# Patient Record
Sex: MALE | Race: BLACK OR AFRICAN AMERICAN | NOT HISPANIC OR LATINO | Employment: FULL TIME | URBAN - METROPOLITAN AREA
[De-identification: names, ages, dates, MRNs, and addresses within clinical notes are randomized per-mention and may not be internally consistent; named-entity substitution may affect disease eponyms.]

---

## 2017-09-01 ENCOUNTER — TELEPHONE (OUTPATIENT)
Dept: UROLOGY | Facility: CLINIC | Age: 62
End: 2017-09-01

## 2017-09-01 NOTE — TELEPHONE ENCOUNTER
----- Message from Livia Wood sent at 9/1/2017 10:46 AM CDT -----  Contact: self  Patient is requesting a prostate examination.   Please advise.   [phone].

## 2017-10-24 ENCOUNTER — OFFICE VISIT (OUTPATIENT)
Dept: UROLOGY | Facility: CLINIC | Age: 62
End: 2017-10-24
Payer: COMMERCIAL

## 2017-10-24 VITALS
HEIGHT: 74 IN | WEIGHT: 198.19 LBS | BODY MASS INDEX: 25.44 KG/M2 | DIASTOLIC BLOOD PRESSURE: 70 MMHG | HEART RATE: 62 BPM | SYSTOLIC BLOOD PRESSURE: 122 MMHG

## 2017-10-24 DIAGNOSIS — N20.0 KIDNEY STONES: ICD-10-CM

## 2017-10-24 DIAGNOSIS — N40.0 BPH WITHOUT OBSTRUCTION/LOWER URINARY TRACT SYMPTOMS: Primary | ICD-10-CM

## 2017-10-24 DIAGNOSIS — N52.9 ED (ERECTILE DYSFUNCTION) OF ORGANIC ORIGIN: ICD-10-CM

## 2017-10-24 LAB
BILIRUB SERPL-MCNC: NORMAL MG/DL
BLOOD URINE, POC: NORMAL
COLOR, POC UA: YELLOW
GLUCOSE UR QL STRIP: NORMAL
KETONES UR QL STRIP: NORMAL
LEUKOCYTE ESTERASE URINE, POC: NORMAL
NITRITE, POC UA: NORMAL
PH, POC UA: 6
PROTEIN, POC: NORMAL
SPECIFIC GRAVITY, POC UA: 1000
UROBILINOGEN, POC UA: NORMAL

## 2017-10-24 PROCEDURE — 81001 URINALYSIS AUTO W/SCOPE: CPT | Mod: S$GLB,,, | Performed by: UROLOGY

## 2017-10-24 PROCEDURE — 99999 PR PBB SHADOW E&M-EST. PATIENT-LVL III: CPT | Mod: PBBFAC,,, | Performed by: UROLOGY

## 2017-10-24 PROCEDURE — 99214 OFFICE O/P EST MOD 30 MIN: CPT | Mod: 25,S$GLB,, | Performed by: UROLOGY

## 2017-10-24 NOTE — PROGRESS NOTES
Subjective:       Patient ID: Rubin Alvarez Sr. is a 62 y.o. male who was referred by No ref. provider found    Chief Complaint:   Chief Complaint   Patient presents with    Benign Prostatic Hypertrophy     annual visit with psa          History of Present Illness  Benign Prostatic Hypertrophy  Patient complains of lower urinary tract symptoms. He reports nocturia one time a night. He denies straining, urgency and weak stream. Patient states symptoms are of no severity. Onset of symptoms was several years ago and was gradual in onset.He has no personal history and a family history of prostate cancer with his brother. He reports a history of no complicating symptoms. He denies flank pain, gross hematuria,and recurrent UTI.    He went to  recently and diagnosed with a kidney stone.  He is not sure if he passed it but he has not had pain for a few weeks now.    Erectile Dysfunction  Patient complains of erectile dysfunction. Onset of dysfunction was several years ago and was gradual in onset.  Patient states the nature of difficulty is both attaining and maintaining erection. Full erections occur never. Partial erections occur never. Libido is not affected. Risk factors for ED include urologic disease, bph  . Patient denies history of pelvic radiation. Previous treatment of ED includes Viagra.    ACTIVE MEDICAL ISSUES:  There is no problem list on file for this patient.      PAST MEDICAL HISTORY  Past Medical History:   Diagnosis Date    Hypertrophy of prostate without urinary obstruction and other lower urinary tract symptoms (LUTS)     Impotence of organic origin     Kidney stone        PAST SURGICAL HISTORY:  Past Surgical History:   Procedure Laterality Date    cysto/ureteroscopy stone removal         SOCIAL HISTORY:  Social History   Substance Use Topics    Smoking status: Never Smoker    Smokeless tobacco: Never Used    Alcohol use No       FAMILY HISTORY:  Family History   Problem Relation Age of Onset  "   Skin cancer Father     Diabetes Mother     Kidney disease Mother     Heart disease Mother     Hypertension Mother        ALLERGIES AND MEDICATIONS: updated and reviewed.  Review of patient's allergies indicates:  No Known Allergies  Current Outpatient Prescriptions   Medication Sig    ERGOCALCIFEROL, VITAMIN D2, (VITAMIN D ORAL) Take by mouth.    esomeprazole (NEXIUM) 40 MG capsule Take 40 mg by mouth before breakfast.    losartan (COZAAR) 50 MG tablet Take 50 mg by mouth once daily.    rosuvastatin (CRESTOR) 20 MG tablet Take 20 mg by mouth once daily.    sildenafil (VIAGRA) 100 MG tablet Take 1 tablet (100 mg total) by mouth daily as needed for Erectile Dysfunction.     No current facility-administered medications for this visit.        Review of Systems   Constitutional: Negative for activity change, fatigue, fever and unexpected weight change.   HENT: Negative for congestion.    Eyes: Negative for redness.   Respiratory: Negative for chest tightness and shortness of breath.    Cardiovascular: Negative for chest pain and leg swelling.   Gastrointestinal: Negative for abdominal pain, constipation, diarrhea, nausea and vomiting.   Genitourinary: Negative for dysuria, flank pain, frequency, hematuria, penile pain, penile swelling, scrotal swelling, testicular pain and urgency.   Musculoskeletal: Negative for arthralgias and back pain.   Neurological: Negative for dizziness and light-headedness.   Psychiatric/Behavioral: Negative for behavioral problems and confusion. The patient is not nervous/anxious.    All other systems reviewed and are negative.      Objective:      Vitals:    10/24/17 1003   BP: 122/70   Pulse: 62   Weight: 89.9 kg (198 lb 3.1 oz)   Height: 6' 2" (1.88 m)     Physical Exam   Nursing note and vitals reviewed.  Constitutional: He is oriented to person, place, and time. He appears well-developed and well-nourished.   HENT:   Head: Normocephalic.   Eyes: Conjunctivae are normal. "   Neck: Normal range of motion. No tracheal deviation present. No thyromegaly present.   Cardiovascular: Normal rate and normal heart sounds.    Pulmonary/Chest: Effort normal and breath sounds normal. No respiratory distress. He has no wheezes.   Abdominal: Soft. He exhibits no distension and no mass. There is no hepatosplenomegaly. There is no tenderness. There is no rebound, no guarding and no CVA tenderness. No hernia. Hernia confirmed negative in the right inguinal area and confirmed negative in the left inguinal area.   Genitourinary: Rectum normal, testes normal and penis normal. Rectal exam shows no external hemorrhoid, no mass and no tenderness. Prostate is enlarged. Prostate is not tender. Right testis shows no mass and no tenderness. Left testis shows no mass and no tenderness.       Musculoskeletal: He exhibits no edema or tenderness.   Lymphadenopathy: No inguinal adenopathy noted on the right or left side.   Neurological: He is alert and oriented to person, place, and time.   Skin: Skin is warm and dry. No rash noted. No erythema.     Psychiatric: He has a normal mood and affect. His behavior is normal. Judgment and thought content normal.       Urine dipstick shows negative for all components.  Micro exam: negative for WBC's or RBC's.      10/19/2017  PSA 1.1    Assessment:       1. BPH without obstruction/lower urinary tract symptoms    2. ED (erectile dysfunction) of organic origin    3. Kidney stones          Plan:       1. BPH without obstruction/lower urinary tract symptoms    - US Retroperitoneal Complete (Kidney and; Future  - POCT urinalysis, dipstick or tablet reag  - Prostate Specific Antigen, Diagnostic; Future    2. ED (erectile dysfunction) of organic origin  Viagra when needed    3. Kidney stones  He will call if he has return of pain.    - US Retroperitoneal Complete (Kidney and; Future            No Follow-up on file.

## 2021-11-24 ENCOUNTER — OFFICE VISIT (OUTPATIENT)
Dept: URGENT CARE | Facility: CLINIC | Age: 66
End: 2021-11-24
Payer: COMMERCIAL

## 2021-11-24 VITALS
OXYGEN SATURATION: 98 % | TEMPERATURE: 98 F | HEIGHT: 74 IN | DIASTOLIC BLOOD PRESSURE: 72 MMHG | SYSTOLIC BLOOD PRESSURE: 115 MMHG | RESPIRATION RATE: 18 BRPM | WEIGHT: 198 LBS | HEART RATE: 89 BPM | BODY MASS INDEX: 25.41 KG/M2
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
## 2021-11-24 DIAGNOSIS — M25.571 ACUTE RIGHT ANKLE PAIN: ICD-10-CM

## 2021-11-24 DIAGNOSIS — M54.50 LUMBAR PAIN: ICD-10-CM

## 2021-11-24 DIAGNOSIS — M54.6 LEFT-SIDED THORACIC BACK PAIN, UNSPECIFIED CHRONICITY: ICD-10-CM

## 2021-11-24 PROCEDURE — 72100 X-RAY EXAM L-S SPINE 2/3 VWS: CPT | Mod: S$GLB,,, | Performed by: RADIOLOGY

## 2021-11-24 PROCEDURE — 99204 OFFICE O/P NEW MOD 45 MIN: CPT | Mod: S$GLB,,, | Performed by: PHYSICIAN ASSISTANT

## 2021-11-24 PROCEDURE — 73610 X-RAY EXAM OF ANKLE: CPT | Mod: RT,S$GLB,, | Performed by: RADIOLOGY

## 2021-11-24 PROCEDURE — 71046 X-RAY EXAM CHEST 2 VIEWS: CPT | Mod: S$GLB,,, | Performed by: RADIOLOGY

## 2021-11-24 PROCEDURE — 73610 XR ANKLE COMPLETE 3 VIEW RIGHT: ICD-10-PCS | Mod: RT,S$GLB,, | Performed by: RADIOLOGY

## 2021-11-24 PROCEDURE — 71046 XR CHEST PA AND LATERAL: ICD-10-PCS | Mod: S$GLB,,, | Performed by: RADIOLOGY

## 2021-11-24 PROCEDURE — 72100 XR LUMBAR SPINE 2 OR 3 VIEWS: ICD-10-PCS | Mod: S$GLB,,, | Performed by: RADIOLOGY

## 2021-11-24 PROCEDURE — 99204 PR OFFICE/OUTPT VISIT, NEW, LEVL IV, 45-59 MIN: ICD-10-PCS | Mod: S$GLB,,, | Performed by: PHYSICIAN ASSISTANT

## 2021-11-24 RX ORDER — CYCLOBENZAPRINE HCL 10 MG
10 TABLET ORAL 3 TIMES DAILY PRN
Qty: 30 TABLET | Refills: 0 | Status: SHIPPED | OUTPATIENT
Start: 2021-11-24 | End: 2021-12-04

## 2021-11-24 RX ORDER — IBUPROFEN 800 MG/1
800 TABLET ORAL 3 TIMES DAILY
Qty: 30 TABLET | Refills: 0 | Status: SHIPPED | OUTPATIENT
Start: 2021-11-24 | End: 2021-12-04

## 2022-12-25 ENCOUNTER — HOSPITAL ENCOUNTER (EMERGENCY)
Facility: HOSPITAL | Age: 67
Discharge: HOME | End: 2022-12-25
Attending: EMERGENCY MEDICINE
Payer: MEDICARE

## 2022-12-25 VITALS
SYSTOLIC BLOOD PRESSURE: 121 MMHG | RESPIRATION RATE: 16 BRPM | DIASTOLIC BLOOD PRESSURE: 64 MMHG | BODY MASS INDEX: 24.52 KG/M2 | TEMPERATURE: 98 F | HEIGHT: 73 IN | HEART RATE: 60 BPM | WEIGHT: 185 LBS | OXYGEN SATURATION: 99 %

## 2022-12-25 DIAGNOSIS — N20.0 KIDNEY STONE: Primary | ICD-10-CM

## 2022-12-25 LAB
ALBUMIN SERPL BCP-MCNC: 4 G/DL (ref 3.5–5.2)
ALP SERPL-CCNC: 57 U/L (ref 55–135)
ALT SERPL W/O P-5'-P-CCNC: 23 U/L (ref 10–44)
AMORPH CRY UR QL COMP ASSIST: ABNORMAL
ANION GAP SERPL CALC-SCNC: 9 MMOL/L (ref 8–16)
AST SERPL-CCNC: 30 U/L (ref 10–40)
BASOPHILS # BLD AUTO: 0.02 K/UL (ref 0–0.2)
BASOPHILS NFR BLD: 0.3 % (ref 0–1.9)
BILIRUB SERPL-MCNC: 0.4 MG/DL (ref 0.1–1)
BILIRUB UR QL STRIP: NEGATIVE
BUN SERPL-MCNC: 9 MG/DL (ref 8–23)
CALCIUM SERPL-MCNC: 8.9 MG/DL (ref 8.7–10.5)
CHLORIDE SERPL-SCNC: 104 MMOL/L (ref 95–110)
CLARITY UR REFRACT.AUTO: ABNORMAL
CO2 SERPL-SCNC: 23 MMOL/L (ref 23–29)
COLOR UR AUTO: YELLOW
CREAT SERPL-MCNC: 1.1 MG/DL (ref 0.5–1.4)
DIFFERENTIAL METHOD: ABNORMAL
EOSINOPHIL # BLD AUTO: 0 K/UL (ref 0–0.5)
EOSINOPHIL NFR BLD: 0.6 % (ref 0–8)
ERYTHROCYTE [DISTWIDTH] IN BLOOD BY AUTOMATED COUNT: 11.9 % (ref 11.5–14.5)
EST. GFR  (NO RACE VARIABLE): >60 ML/MIN/1.73 M^2
GLUCOSE SERPL-MCNC: 146 MG/DL (ref 70–110)
GLUCOSE UR QL STRIP: NEGATIVE
HCT VFR BLD AUTO: 38.9 % (ref 40–54)
HGB BLD-MCNC: 13.1 G/DL (ref 14–18)
HGB UR QL STRIP: ABNORMAL
IMM GRANULOCYTES # BLD AUTO: 0.01 K/UL (ref 0–0.04)
IMM GRANULOCYTES NFR BLD AUTO: 0.2 % (ref 0–0.5)
KETONES UR QL STRIP: NEGATIVE
LEUKOCYTE ESTERASE UR QL STRIP: NEGATIVE
LIPASE SERPL-CCNC: 10 U/L (ref 4–60)
LYMPHOCYTES # BLD AUTO: 1.3 K/UL (ref 1–4.8)
LYMPHOCYTES NFR BLD: 19.7 % (ref 18–48)
MCH RBC QN AUTO: 32.2 PG (ref 27–31)
MCHC RBC AUTO-ENTMCNC: 33.7 G/DL (ref 32–36)
MCV RBC AUTO: 96 FL (ref 82–98)
MICROSCOPIC COMMENT: ABNORMAL
MONOCYTES # BLD AUTO: 0.4 K/UL (ref 0.3–1)
MONOCYTES NFR BLD: 5.5 % (ref 4–15)
NEUTROPHILS # BLD AUTO: 4.7 K/UL (ref 1.8–7.7)
NEUTROPHILS NFR BLD: 73.7 % (ref 38–73)
NITRITE UR QL STRIP: NEGATIVE
NRBC BLD-RTO: 0 /100 WBC
PH UR STRIP: 7 [PH] (ref 5–8)
PLATELET # BLD AUTO: 158 K/UL (ref 150–450)
PMV BLD AUTO: 10.7 FL (ref 9.2–12.9)
POTASSIUM SERPL-SCNC: 4.5 MMOL/L (ref 3.5–5.1)
PROT SERPL-MCNC: 6.8 G/DL (ref 6–8.4)
PROT UR QL STRIP: NEGATIVE
RBC # BLD AUTO: 4.07 M/UL (ref 4.6–6.2)
RBC #/AREA URNS AUTO: 90 /HPF (ref 0–4)
SODIUM SERPL-SCNC: 136 MMOL/L (ref 136–145)
SP GR UR STRIP: 1.01 (ref 1–1.03)
SQUAMOUS #/AREA URNS AUTO: 0 /HPF
URN SPEC COLLECT METH UR: ABNORMAL
WBC # BLD AUTO: 6.4 K/UL (ref 3.9–12.7)
WBC #/AREA URNS AUTO: 4 /HPF (ref 0–5)

## 2022-12-25 PROCEDURE — 96374 THER/PROPH/DIAG INJ IV PUSH: CPT

## 2022-12-25 PROCEDURE — 99284 EMERGENCY DEPT VISIT MOD MDM: CPT | Mod: ,,, | Performed by: EMERGENCY MEDICINE

## 2022-12-25 PROCEDURE — 99284 PR EMERGENCY DEPT VISIT,LEVEL IV: ICD-10-PCS | Mod: ,,, | Performed by: EMERGENCY MEDICINE

## 2022-12-25 PROCEDURE — 85025 COMPLETE CBC W/AUTO DIFF WBC: CPT | Performed by: EMERGENCY MEDICINE

## 2022-12-25 PROCEDURE — 83690 ASSAY OF LIPASE: CPT | Performed by: EMERGENCY MEDICINE

## 2022-12-25 PROCEDURE — 80053 COMPREHEN METABOLIC PANEL: CPT | Performed by: EMERGENCY MEDICINE

## 2022-12-25 PROCEDURE — 96375 TX/PRO/DX INJ NEW DRUG ADDON: CPT

## 2022-12-25 PROCEDURE — 81001 URINALYSIS AUTO W/SCOPE: CPT | Performed by: EMERGENCY MEDICINE

## 2022-12-25 PROCEDURE — 63600175 PHARM REV CODE 636 W HCPCS: Performed by: EMERGENCY MEDICINE

## 2022-12-25 PROCEDURE — 25500020 PHARM REV CODE 255: Performed by: EMERGENCY MEDICINE

## 2022-12-25 PROCEDURE — 99285 EMERGENCY DEPT VISIT HI MDM: CPT | Mod: 25

## 2022-12-25 RX ORDER — ONDANSETRON 4 MG/1
4 TABLET, FILM COATED ORAL EVERY 6 HOURS PRN
Qty: 10 TABLET | Refills: 0 | Status: SHIPPED | OUTPATIENT
Start: 2022-12-25

## 2022-12-25 RX ORDER — IBUPROFEN 400 MG/1
400 TABLET ORAL EVERY 6 HOURS PRN
Qty: 20 TABLET | Refills: 0 | Status: SHIPPED | OUTPATIENT
Start: 2022-12-25

## 2022-12-25 RX ORDER — TAMSULOSIN HYDROCHLORIDE 0.4 MG/1
CAPSULE ORAL DAILY
COMMUNITY

## 2022-12-25 RX ORDER — OXYCODONE HYDROCHLORIDE 5 MG/1
5 TABLET ORAL EVERY 4 HOURS PRN
Qty: 8 TABLET | Refills: 0 | Status: SHIPPED | OUTPATIENT
Start: 2022-12-25

## 2022-12-25 RX ORDER — EZETIMIBE 10 MG/1
10 TABLET ORAL DAILY
COMMUNITY

## 2022-12-25 RX ORDER — MORPHINE SULFATE 2 MG/ML
6 INJECTION, SOLUTION INTRAMUSCULAR; INTRAVENOUS
Status: COMPLETED | OUTPATIENT
Start: 2022-12-25 | End: 2022-12-25

## 2022-12-25 RX ORDER — ACETAMINOPHEN 325 MG/1
650 TABLET ORAL EVERY 6 HOURS PRN
Qty: 30 TABLET | Refills: 0 | Status: SHIPPED | OUTPATIENT
Start: 2022-12-25

## 2022-12-25 RX ORDER — KETOROLAC TROMETHAMINE 30 MG/ML
10 INJECTION, SOLUTION INTRAMUSCULAR; INTRAVENOUS
Status: COMPLETED | OUTPATIENT
Start: 2022-12-25 | End: 2022-12-25

## 2022-12-25 RX ORDER — ONDANSETRON 2 MG/ML
4 INJECTION INTRAMUSCULAR; INTRAVENOUS
Status: COMPLETED | OUTPATIENT
Start: 2022-12-25 | End: 2022-12-25

## 2022-12-25 RX ADMIN — ONDANSETRON 4 MG: 2 INJECTION INTRAMUSCULAR; INTRAVENOUS at 08:12

## 2022-12-25 RX ADMIN — IOHEXOL 100 ML: 350 INJECTION, SOLUTION INTRAVENOUS at 10:12

## 2022-12-25 RX ADMIN — KETOROLAC TROMETHAMINE 10 MG: 30 INJECTION, SOLUTION INTRAMUSCULAR; INTRAVENOUS at 08:12

## 2022-12-25 RX ADMIN — MORPHINE SULFATE 6 MG: 2 INJECTION, SOLUTION INTRAMUSCULAR; INTRAVENOUS at 08:12

## 2022-12-25 NOTE — DISCHARGE INSTRUCTIONS
Your history, exam, labs, CT scanner consistent with kidney stone, renal colic.    At home you can take Tylenol and ibuprofen for pain control in over-the-counter doses.  You were prescribed a short course of oxycodone for breakthrough pain.    Please follow-up with the urologist for continued evaluation of your kidney stones.  You were given a strainer please urinate through a strainer to collect the kidney stone for analysis.    If you develop uncontrolled pain, fevers, pain with urination, weakness, or any other new, worsening worrisome symptoms please return to the emergency department for re-evaluation.

## 2022-12-25 NOTE — ED NOTES
Moaning in pain. Started today    Patient identifiers for Rubin Alvarez Sr. 67 y.o. male checked and correct.  Chief Complaint   Patient presents with    Abdominal Pain     Lower back and right sided flank pain began 6:30am. 10/10 pain, hx of kidney stones. Still has appendix and gallbladder      Past Medical History:   Diagnosis Date    Hypertrophy of prostate without urinary obstruction and other lower urinary tract symptoms (LUTS)     Impotence of organic origin     Kidney stone      Allergies reported: Review of patient's allergies indicates:  No Known Allergies    Appearance: Pt awake, alert & oriented to person, place & time. Pt in no acute distress at present time. Pt is clean and well groomed with clothes appropriately fastened.   Skin: Skin warm, dry & intact. Color consistent with ethnicity. Mucous membranes moist. No breakdown or brusing noted.   Musculoskeletal: Patient moving all extremities well, no obvious swelling or deformities noted.   Respiratory: Respirations spontaneous, even, and non-labored. Visible chest rise noted. Airway is open and patent. No accessory muscle use noted.   Neurologic: Sensation is intact. Speech is clear and appropriate. Eyes open spontaneously, behavior appropriate to situation, follows commands, facial expression symmetrical, bilateral hand grasp equal and even, purposeful motor response noted.  Cardiac: All peripheral pulses present. No Bilateral lower extremity edema. Cap refill is <3 seconds.  Abdomen: Abdomen soft, non distended, non tender to palpation.   : Pt voids independently, denies dysuria, hematuria, frequency.

## 2022-12-25 NOTE — ED PROVIDER NOTES
Encounter Date: 12/25/2022       History     Chief Complaint   Patient presents with    Abdominal Pain     Lower back and right sided flank pain began 6:30am. 10/10 pain, hx of kidney stones. Still has appendix and gallbladder      HPI  67-year-old male with past history of kidney stones coming in with severe right-sided lower back pain and right groin pain starting at 6:30 a.m. this morning.  Feels somewhat like his previous kidney stone.  No clear dysuria frequency or hematuria.  No fevers.  Patient is not participating and significantly more history taking as he is in significant pain.  Positive nausea with no vomiting.    Will ask further questions once pain is controlled.    Once pain is controlled is able to answer questions more clearly.  Right lower back pain radiating around his abdomen and down to his groin.  No specific penile or testicular pain.  No dysuria or frequency.  He is not noticed any hematuria.  His previous kidney stone was on the left.  No abdominal surgeries.    Review of patient's allergies indicates:  No Known Allergies  Past Medical History:   Diagnosis Date    Hypertrophy of prostate without urinary obstruction and other lower urinary tract symptoms (LUTS)     Impotence of organic origin     Kidney stone      Past Surgical History:   Procedure Laterality Date    cysto/ureteroscopy stone removal       Family History   Problem Relation Age of Onset    Skin cancer Father     Diabetes Mother     Kidney disease Mother     Heart disease Mother     Hypertension Mother      Social History     Tobacco Use    Smoking status: Never    Smokeless tobacco: Never   Substance Use Topics    Alcohol use: No    Drug use: No     Review of Systems    Patient not participating in history secondary to significant pain.    Physical Exam     Initial Vitals [12/25/22 0813]   BP Pulse Resp Temp SpO2   (!) 194/97 73 18 97.7 °F (36.5 °C) 97 %      MAP       --         Physical Exam    Physical Exam:  CONSTITUTIONAL:  Well developed, well nourished, very uncomfortable  HENT: Normocephalic, atraumatic    EYES: Sclerae anicteric   NECK: Supple, no thyroid enlargement  CARDIOVASCULAR: Regular rate and rhythm without any murmurs, gallops, rubs.  RESPIRATORY: Speaking in full sentences. Breathing comfortably. Auscultation of the lungs revealed normal breath sounds b/l, no wheezing, no rales, no rhonchi.  ABDOMEN: Soft nonspecific right-sided abdominal discomfort to palpation, lower greater than upper, no masses, no rebound or guarding.  No clear right CVA  NEUROLOGIC: Alert, interacting normally. No facial droop.   MSK: Moving all four extremities.  Skin: Warm and dry. No visible rash on exposed areas of skin.    Psych:  Anxious, uncomfortable      ED Course   Procedures  Labs Reviewed   CBC W/ AUTO DIFFERENTIAL - Abnormal; Notable for the following components:       Result Value    RBC 4.07 (*)     Hemoglobin 13.1 (*)     Hematocrit 38.9 (*)     MCH 32.2 (*)     Gran % 73.7 (*)     All other components within normal limits   COMPREHENSIVE METABOLIC PANEL - Abnormal; Notable for the following components:    Glucose 146 (*)     All other components within normal limits   URINALYSIS, REFLEX TO URINE CULTURE - Abnormal; Notable for the following components:    Appearance, UA Cloudy (*)     Occult Blood UA 2+ (*)     All other components within normal limits    Narrative:     Specimen Source->Urine   URINALYSIS MICROSCOPIC - Abnormal; Notable for the following components:    RBC, UA 90 (*)     All other components within normal limits    Narrative:     Specimen Source->Urine   LIPASE          Imaging Results               CT Abdomen Pelvis With Contrast (Final result)  Result time 12/25/22 11:04:43      Final result by Los Del Castillo MD (12/25/22 11:04:43)                   Impression:      Right 0.3 cm UVJ obstructing stone with mild hydronephrosis.    Prostatomegaly.    Probable hepatic cyst.    Additional findings as above.    Findings  discussed with Tobias Amador MD at 10:25    This report was flagged in Epic as abnormal.    Electronically signed by resident: Ritika Lacey  Date:    12/25/2022  Time:    10:11    Electronically signed by: Los Del Castillo MD  Date:    12/25/2022  Time:    11:04               Narrative:    EXAMINATION:  CT ABDOMEN PELVIS WITH CONTRAST    CLINICAL HISTORY:  RLQ abdominal pain (Age >= 14y);R side abdo pain, Kidney stone vs. appy?;    TECHNIQUE:  Low dose axial images, sagittal and coronal reformations were obtained from the lung bases to the pubic symphysis following the IV administration of 100 mL of Omnipaque 350.Oral contrast was not given.    COMPARISON:  None    FINDINGS:  Heart: Normal in size. No pericardial effusion.    Lung Bases: Mild left lung base dependent ground-glass opacities, likely dependent atelectasis.  No pleural effusion.    Liver: Normal in size and attenuation.  Left hepatic lobe fluid attenuation hypodense lesion likely renal cyst measure 2 cm (axial series image 25) with nearby small subcentimeter hypodensity, too small to characterize.    Gallbladder: No calcified gallstones.    Bile Ducts: No intra or extrahepatic biliary duct dilatation.    Pancreas: No mass or peripancreatic fat stranding.    Spleen: Unremarkable.    Adrenals: Unremarkable.    Kidneys/ Ureters: Normal in size and location. Normal enhancement.  There is 0.3 cm right UVJ stone with hydroureter and hydronephrosis as well as perinephric fat stranding.  Punctate right middle pole nephrolithiasis.  No left nephrolithiasis, hydronephrosis or hydroureter.    Bladder: Diffuse bladder wall thickening, likely due to nondistention.    Reproductive organs: Enlarged prostate measuring 5.8 cm with dystrophic calcifications.    Bowel/Mesentery: Small hiatal hernia.  Small bowel is normal in caliber with no evidence of obstruction. No evidence of inflammation or wall thickening.  Appendix is identified and appear unremarkable.  Colon  demonstrates no focal wall thickening.    Peritoneal Space: No ascites. No free air.    Retroperitoneum: No significant adenopathy.    Abdominal wall: Bilateral inguinal nodes without lymphadenopathy.    Vasculature: Mild calcific atherosclerosis.  No aneurysm.    Bones: Mild degenerative changes.  No acute fracture.                                       Medications   ketorolac injection 9.999 mg (9.999 mg Intravenous Given 12/25/22 0844)   morphine injection 6 mg (6 mg Intravenous Given 12/25/22 0843)   ondansetron injection 4 mg (4 mg Intravenous Given 12/25/22 0844)   iohexoL (OMNIPAQUE 350) injection 100 mL (100 mLs Intravenous Given 12/25/22 1006)     Medical Decision Making:   History:   Old Medical Records: I decided to obtain old medical records.  Clinical Tests:   Lab Tests: Ordered and Reviewed  Radiological Study: Ordered and Reviewed     67-year-old male with past history as noted coming in with sudden onset right-sided back/lower abdominal pain.  Very uncomfortable in the emergency department.    Nonspecific tenderness in the right abdomen.    Can not obtain significant more history at this time because patient is still uncomfortable.  Will control pain with Toradol and morphine.  Zofran for nausea as he has also complained of nausea.  No vomiting.    Will obtain CT scan with contrast to look for kidney stone versus appy versus other bowel pathology.  Reassess after pain medications for further history.  Lack of primary penile or testicular pain, radiation from the lower abdomen/flank, not consistent with dangerous testicular/penile pathology at this time.    Disposition based on ED workup and patient's symptomatology.    Update:  On reassessment after Toradol and morphine patient is significantly more comfortable.    Vitals remained benign, blood pressure improved with pain control.    Labs are benign, creatinine is normal.    Urine with blood but no signs of infection.  CT scan shows a 3 mm stone at  the UPJ with mild hydronephrosis, there is some perinephric stranding.  Clinically given the lack of fevers, flank pain, CVA tenderness, lack of infectious findings on the UA not consistent with infected stone.  Pain is controlled emergency department.  Will discharge home with urine strainer, Tylenol and Motrin for pain control, with oxycodone for breakthrough pain, Zofran for nausea control.  Outpatient follow-up with Urology, ED return precautions.    Findings of ED work up explained to patient. Patient agrees with discharge plan and verbalizes understanding of return precautions.                          Clinical Impression:   Final diagnoses:  [N20.0] Kidney stone (Primary)        ED Disposition Condition    Discharge Stable          ED Prescriptions       Medication Sig Dispense Start Date End Date Auth. Provider    ibuprofen (ADVIL,MOTRIN) 400 MG tablet Take 1 tablet (400 mg total) by mouth every 6 (six) hours as needed. 20 tablet 12/25/2022 -- Tobias Amador MD    acetaminophen (TYLENOL) 325 MG tablet Take 2 tablets (650 mg total) by mouth every 6 (six) hours as needed for Pain. 30 tablet 12/25/2022 -- Tobias Amador MD    oxyCODONE (ROXICODONE) 5 MG immediate release tablet Take 1 tablet (5 mg total) by mouth every 4 (four) hours as needed for Pain. 8 tablet 12/25/2022 -- Tobias Amador MD    ondansetron (ZOFRAN) 4 MG tablet Take 1 tablet (4 mg total) by mouth every 6 (six) hours as needed for Nausea. 10 tablet 12/25/2022 -- Tobias Amador MD          Follow-up Information       Follow up With Specialties Details Why Contact Info Additional Information    [facility] [address] [facility] In 1 week  [id number]  [facility] [address] [phone] Main Building, 4th Floor Please park in [facility] and take [facility] elevator             Tobias Amador MD  12/26/22 2729

## 2024-09-12 ENCOUNTER — OFFICE VISIT (OUTPATIENT)
Dept: URGENT CARE | Facility: CLINIC | Age: 69
End: 2024-09-12
Payer: MEDICARE

## 2024-09-12 VITALS
RESPIRATION RATE: 18 BRPM | HEART RATE: 80 BPM | OXYGEN SATURATION: 96 % | DIASTOLIC BLOOD PRESSURE: 88 MMHG | BODY MASS INDEX: 26.44 KG/M2 | HEIGHT: 74 IN | TEMPERATURE: 97 F | SYSTOLIC BLOOD PRESSURE: 161 MMHG | WEIGHT: 206 LBS

## 2024-09-12 DIAGNOSIS — S01.511A LIP LACERATION, INITIAL ENCOUNTER: Primary | ICD-10-CM

## 2024-09-12 RX ORDER — B1/B2/B3/B5/B6/IRON/METH/CHOLN 2.5-18/15
1 LIQUID (ML) ORAL
COMMUNITY

## 2024-09-12 RX ORDER — METFORMIN HYDROCHLORIDE 500 MG/1
500 TABLET, EXTENDED RELEASE ORAL
COMMUNITY

## 2024-09-12 RX ORDER — LIDOCAINE HYDROCHLORIDE 20 MG/ML
2.5 SOLUTION OROPHARYNGEAL ONCE
Status: COMPLETED | OUTPATIENT
Start: 2024-09-12 | End: 2024-09-12

## 2024-09-12 RX ORDER — PERPHENAZINE 2 MG
TABLET ORAL DAILY
COMMUNITY

## 2024-09-12 RX ORDER — ASPIRIN 81 MG/1
TABLET ORAL
COMMUNITY

## 2024-09-12 RX ORDER — AMOXICILLIN 875 MG/1
875 TABLET, FILM COATED ORAL EVERY 12 HOURS
Qty: 6 TABLET | Refills: 0 | Status: SHIPPED | OUTPATIENT
Start: 2024-09-12 | End: 2024-09-15

## 2024-09-12 RX ORDER — CELECOXIB 200 MG/1
200 CAPSULE ORAL
COMMUNITY

## 2024-09-12 RX ORDER — ZINC GLUCONATE 50 MG
TABLET ORAL
COMMUNITY

## 2024-09-12 RX ADMIN — LIDOCAINE HYDROCHLORIDE 2.5 ML: 20 SOLUTION OROPHARYNGEAL at 03:09

## 2024-09-12 NOTE — PROGRESS NOTES
"Subjective:      Patient ID: Rubin Alvarez Sr. is a 69 y.o. male.    Vitals:  height is 6' 2" (1.88 m) and weight is 93.4 kg (206 lb). His temperature is 97.3 °F (36.3 °C). His blood pressure is 161/88 (abnormal) and his pulse is 80. His respiration is 18 and oxygen saturation is 96%.     Chief Complaint: Lip Split    Pt was hit in the face with a ladder trying to stepped up on it. Ladder hit pt in his upper lip and he has had bleeding to the laceration since then. Last tetanus was back in 2014. No other reported complaints or concerns at this time.     Other  This is a new problem. The current episode started today. The problem occurs constantly. The problem has been unchanged. Pertinent negatives include no abdominal pain, anorexia, arthralgias, change in bowel habit, chest pain, chills, congestion, coughing, diaphoresis, fatigue, fever, headaches, joint swelling, myalgias, nausea, neck pain, numbness, rash, sore throat, swollen glands, urinary symptoms, vertigo, visual change, vomiting or weakness. Nothing aggravates the symptoms. He has tried ice for the symptoms. The treatment provided mild relief.       Constitution: Negative for chills, sweating, fatigue and fever.   HENT:  Negative for congestion and sore throat.    Neck: Negative for neck pain.   Cardiovascular:  Negative for chest pain.   Respiratory:  Negative for cough.    Gastrointestinal:  Negative for abdominal pain, nausea and vomiting.   Musculoskeletal:  Negative for joint pain, joint swelling and muscle ache.   Skin:  Negative for rash.   Neurological:  Negative for history of vertigo, headaches and numbness.      Objective:     Physical Exam   Constitutional: He is oriented to person, place, and time. He does not appear ill. No distress.   HENT:   Head: Normocephalic.   Mouth/Throat:      Comments: Approx 2 cm laceration extending from dry to wet vermillion of the right upper lip. Does not cross midline or vermillion border.   Eyes: Conjunctivae " are normal.   Pulmonary/Chest: No respiratory distress.   Neurological: He is alert and oriented to person, place, and time. Coordination normal.   Skin: Skin is warm and dry.   Psychiatric: His behavior is normal.   Nursing note and vitals reviewed.      Assessment:     1. Lip laceration, initial encounter        Plan:     Laceration repaired as per procedure note. Tdap updated. Wound care discussed and prophylactic antibiotic prescribed. Reviewed post repair care and provided instructions on his AVS. Patient and his wife voiced understanding and agreement with the POC. No questions prior to discharge. Will return for removal of the 2 nonabsorbable sutures in 3-5 days.     Lip laceration, initial encounter  -     LIDOcaine viscous HCl 2% oral solution 2.5 mL  -     Tdap (BOOSTRIX) vaccine injection 0.5 mL  -     amoxicillin (AMOXIL) 875 MG tablet; Take 1 tablet (875 mg total) by mouth every 12 (twelve) hours. for 3 days  Dispense: 6 tablet; Refill: 0  -     Laceration Repair

## 2024-09-12 NOTE — PATIENT INSTRUCTIONS
- Eat soft foods for two to three days.  - Rinse the mouth with water after eating.  - Avoid spicy or salty foods until the wound is healed.  - Avoid the use of straws (negative pressure may increase ecchymosis or bleeding at the wound site).  - Take prophylactic antibiotic as prescribed with food or yogurt.

## 2024-09-12 NOTE — PROCEDURES
Laceration Repair    Date/Time: 9/12/2024 2:15 PM    Performed by: Marissa Trevino MD  Authorized by: Marissa Trevino MD  Body area: mouth  Location details: upper lip, interior  Laceration length: 2 cm  Foreign bodies: no foreign bodies  Tendon involvement: none  Nerve involvement: none  Vascular damage: no  Anesthesia: nerve block (Infraorbital- right)    Anesthesia:  Local Anesthetic: lidocaine 1% with epinephrine and topical anesthetic (topical viscous lidocaine)  Anesthetic total: 3 (1ml viscous lidocaine, 2ml injected lidocaine) mL  Preparation: Patient was prepped and draped in the usual sterile fashion.  Irrigation solution: saline  Irrigation method: syringe  Amount of cleaning: standard  Debridement: none  Degree of undermining: minimal  Skin closure: 5-0 nylon  Subcutaneous closure: 5-0 Vicryl  Number of sutures: 3  Technique: simple  Approximation: close  Approximation difficulty: complex  Dressing: antibiotic ointment  Patient tolerance: Patient tolerated the procedure well with no immediate complications

## 2024-09-16 ENCOUNTER — OFFICE VISIT (OUTPATIENT)
Dept: URGENT CARE | Facility: CLINIC | Age: 69
End: 2024-09-16
Payer: MEDICARE

## 2024-09-16 VITALS
OXYGEN SATURATION: 97 % | RESPIRATION RATE: 18 BRPM | HEART RATE: 68 BPM | SYSTOLIC BLOOD PRESSURE: 148 MMHG | WEIGHT: 206 LBS | TEMPERATURE: 98 F | DIASTOLIC BLOOD PRESSURE: 84 MMHG | BODY MASS INDEX: 26.44 KG/M2 | HEIGHT: 74 IN

## 2024-09-16 DIAGNOSIS — Z48.02 VISIT FOR SUTURE REMOVAL: Primary | ICD-10-CM

## 2024-09-16 PROCEDURE — 99024 POSTOP FOLLOW-UP VISIT: CPT | Mod: S$GLB,,, | Performed by: FAMILY MEDICINE

## 2024-09-16 PROCEDURE — 99499 UNLISTED E&M SERVICE: CPT | Mod: S$GLB,,, | Performed by: FAMILY MEDICINE

## 2024-09-16 NOTE — PROGRESS NOTES
"Subjective:      Patient ID: Rubin Alvarez Sr. is a 69 y.o. male.    Vitals:  height is 6' 2" (1.88 m) and weight is 93.4 kg (206 lb). His oral temperature is 97.6 °F (36.4 °C). His blood pressure is 148/84 (abnormal) and his pulse is 68. His respiration is 18 and oxygen saturation is 97%.     Chief Complaint: Suture / Staple Removal    Pt states that he is here for suture removal from his lip laceration on 9/12  Provider note begins below:  Patient presents for suture removal, sutures to lip placed on September 12th, patient was repaired with 2 nonabsorbable and 1 absorbable suture.  Patient was given amoxicillin.  He reports he has been taking antibiotics as directed, denies any pain, drainage, fever or chills.     Suture / Staple Removal  The sutures were placed 3 to 6 days ago. The treatment provided moderate relief. His temperature was unmeasured prior to arrival. There has been no drainage from the wound. The swelling has improved. He has no difficulty moving the affected extremity or digit.       Constitution: Negative for activity change, appetite change, chills, sweating, fatigue and fever.   Skin:  Positive for laceration.      Objective:     Physical Exam   Constitutional: He is oriented to person, place, and time. He appears well-developed.  Non-toxic appearance. He does not appear ill. No distress.   HENT:   Head: Normocephalic and atraumatic.   Ears:   Right Ear: External ear normal.   Left Ear: External ear normal.   Nose: Nose normal.   Mouth/Throat: Uvula is midline, oropharynx is clear and moist and mucous membranes are normal. No trismus in the jaw. No uvula swelling.       Eyes: Conjunctivae, EOM and lids are normal. Pupils are equal, round, and reactive to light.   Neck: Trachea normal and phonation normal. Neck supple.   Musculoskeletal: Normal range of motion.         General: Normal range of motion.   Neurological: He is alert and oriented to person, place, and time.   Skin: Skin is warm, " dry, intact and not diaphoretic.   Psychiatric: His speech is normal and behavior is normal. Judgment and thought content normal.   Nursing note and vitals reviewed.      Assessment:     1. Visit for suture removal      Suture Removal     Date/Time: 9/16/2024 12:15 PM  Location procedure was performed: [facility]     Performed by: Madelaine Molina NP  Authorized by: Madelaine Mloina NP  Body area: mouth  Description of findings: c/d/i, scab   Wound Appearance: clean and no drainage  Sutures Removed: 2  Staples Removed: 0  Facility: sutures placed in this facility  Complications: No  Patient tolerance: Patient tolerated the procedure well with no immediate complications     Plan:     2 sutures removed, absorbable in place, tolerated well, encouraged vasoline to scab on lip, f/u with any concerns     Discussed results/diagnosis/plan with patient in clinic. Strict precautions given to patient to monitor for worsening signs and symptoms. Advised to follow up with PCP or specialist.    Explained side effects of medications prescribed with patient and informed him/her to discontinue use if he/she has any side effects and to inform UC or PCP if this occurs. All questions answered. Strict ED verses clinic return precautions stressed and given in depth. Advised if symptoms worsens of fail to improve he/she should go to the Emergency Room. Discharge and follow-up instructions given verbally/printed with the patient who expressed understanding and willingness to comply with my recommendations. Patient voiced understanding and in agreement with current treatment plan. Patient exits the exam room in no acute distress. Conversant and engaged during discharge discussion, verbalized understanding.      Visit for suture removal  -     Suture Removal    Other orders  -     Cancel: Laceration Repair

## 2024-09-16 NOTE — PROCEDURES
Suture Removal    Date/Time: 9/16/2024 12:15 PM  Location procedure was performed: [facility]    Performed by: Madelaine Molina NP  Authorized by: Madelaine Molina NP  Body area: mouth  Description of findings: c/d/i, scab   Wound Appearance: clean and no drainage  Sutures Removed: 2  Staples Removed: 0  Facility: sutures placed in this facility  Complications: No  Patient tolerance: Patient tolerated the procedure well with no immediate complications

## 2024-09-16 NOTE — PATIENT INSTRUCTIONS
General Discharge Instructions   PLEASE READ YOUR DISCHARGE INSTRUCTIONS ENTIRELY AS IT CONTAINS IMPORTANT INFORMATION.  If you were prescribed a narcotic or controlled medication, do not drive or operate heavy equipment or machinery while taking these medications.  If you were prescribed antibiotics, please take them to completion.  You must understand that you've received an Urgent Care treatment only and that you may be released before all your medical problems are known or treated. You, the patient, will arrange for follow up care as instructed.    OVER THE COUNTER RECOMMENDATIONS/SUGGESTIONS.    Make sure to stay well hydrated.    Use Nasal Saline to mechanically move any post nasal drip from your eustachian tube or from the back of your throat.    Use warm salt water gargles to ease your throat pain. Warm salt water gargles as needed for sore throat- 1/2 tsp salt to 1 cup warm water, gargle as desired.    Use an antihistamine such as Claritin, Zyrtec or Allegra to dry you out.    Use pseudoephedrine (behind the counter) to decongest. Pseudoephedrine 30 mg up to 240 mg /day. It can raise your blood pressure and give you palpitations.    Use mucinex (guaifenesin) to break up mucous up to 2400mg/day to loosen any mucous.    The mucinex DM pill has a cough suppressant that can be sedating. It can be used at night to stop the tickle at the back of your throat.    You can use Mucinex D (it has guaifenesin and a high dose of pseudoephedrine) in the mornings to help decongest.    Use Afrin in each nare for no longer than 3 days, as it is addictive. It can also dry out your mucous membranes and cause elevated blood pressure. This is especially useful if you are flying.    Use Flonase 1-2 sprays/nostril per day. It is a local acting steroid nasal spray, if you develop a bloody nose, stop using the medication immediately.    Sometimes Nyquil at night is beneficial to help you get some rest, however it is sedating and it  does have an antihistamine, and tylenol.    Honey is a natural cough suppressant that can be used.    Tylenol up to 4,000 mg a day is safe for short periods and can be used for body aches, pain, and fever. However in high doses and prolonged use it can cause liver irritation.    Ibuprofen is a non-steroidal anti-inflammatory that can be used for body aches, pain, and fever.However it can also cause stomach irritation if over used.     Follow up with your PCP or specialty clinic as instructed in the next 2-3 days if not improved or as needed. You can call [phone] to schedule an appointment with appropriate provider.      If you condition worsens, we recommend that you receive another evaluation at the emergency room immediately or contact your primary medical clinic's after hours call service to discuss your concerns.      Please return here or go to the Emergency Department for any concerns or worsening condition.   You can also call [phone] to schedule an appointment with the appropriate provider.    Please return here or go to the Emergency Department for any concerns or worsening of condition.    Thank you for choosing [facility]!    Our goal in the Urgent Care is to always provide outstanding medical care. You may receive a survey by mail or e-mail in the next week regarding your experience today. We would greatly appreciate you completing and returning the survey. Your feedback provides us with a way to recognize our staff who provide very good care, and it helps us learn how to improve when your experience was below our aspiration of excellence.      We appreciate you trusting us with your medical care. We hope you feel better soon. We will be happy to take care of you for all of your future medical needs.    Sincerely,    JAMEL Pepper    Suture/Staple Removal  Please return here or go to the Emergency Department for any concerns or worsening of condition.  If you were prescribed  antibiotics, please take them to completion.  If you were prescribed a narcotic medication, do not drive or operate heavy equipment or machinery while taking these medications.  If you have any of the following symptoms below, please go directly to the ER:  Wound reopens or bleeds  Signs of an infection, such as:  Increasing redness or swelling around the wound  Increased warmth from the wound  Worsening pain  Red streaking lines away from the wound  Fluid draining from the wound  Fever of 100.4°F (38°C) or higher, or as directed by your child's healthcare provider  Please follow up with your primary care doctor or specialist in the next 48-72hrs as needed.    If you  smoke, please stop smoking.